# Patient Record
Sex: FEMALE | Race: WHITE | NOT HISPANIC OR LATINO | URBAN - NONMETROPOLITAN AREA
[De-identification: names, ages, dates, MRNs, and addresses within clinical notes are randomized per-mention and may not be internally consistent; named-entity substitution may affect disease eponyms.]

---

## 2020-08-05 ENCOUNTER — IMPORTED ENCOUNTER (OUTPATIENT)
Dept: URBAN - NONMETROPOLITAN AREA CLINIC 1 | Facility: CLINIC | Age: 60
End: 2020-08-05

## 2020-08-05 PROBLEM — H52.4: Noted: 2020-08-05

## 2020-08-05 PROBLEM — H52.03: Noted: 2020-08-05

## 2020-08-05 PROBLEM — H25.13: Noted: 2020-08-05

## 2020-08-05 PROCEDURE — 92015 DETERMINE REFRACTIVE STATE: CPT

## 2020-08-05 PROCEDURE — 92004 COMPRE OPH EXAM NEW PT 1/>: CPT

## 2020-08-05 NOTE — PATIENT DISCUSSION
Simple Hyperopia OU w/Presbyopia-  discussed findings w/patient-  new spectacle Rx issued-  monitor yearly or prn Cataracts OU -  dsicussed findings w/ patient -  no treatment indicated at this time -  UV protection recommended-  monitor yearly or prn; 's Notes: MR 8/5/2020DFE 8/5/2020

## 2022-04-09 ASSESSMENT — TONOMETRY
OD_IOP_MMHG: 16
OS_IOP_MMHG: 16

## 2022-04-09 ASSESSMENT — VISUAL ACUITY
OS_CC: 20/30
OU_CC: 20/25
OD_CC: 20/30
OU_CC: J1+